# Patient Record
Sex: MALE
[De-identification: names, ages, dates, MRNs, and addresses within clinical notes are randomized per-mention and may not be internally consistent; named-entity substitution may affect disease eponyms.]

---

## 2018-01-16 ENCOUNTER — HOSPITAL ENCOUNTER (EMERGENCY)
Dept: HOSPITAL 42 - ED | Age: 48
Discharge: HOME | End: 2018-01-16
Payer: SELF-PAY

## 2018-01-16 VITALS — RESPIRATION RATE: 18 BRPM | OXYGEN SATURATION: 99 % | TEMPERATURE: 98.3 F

## 2018-01-16 VITALS — HEART RATE: 72 BPM | DIASTOLIC BLOOD PRESSURE: 80 MMHG | SYSTOLIC BLOOD PRESSURE: 125 MMHG

## 2018-01-16 DIAGNOSIS — K08.89: Primary | ICD-10-CM

## 2018-01-16 NOTE — ED PDOC
Arrival/HPI





- General


Chief Complaint: Dental Pain


Time Seen by Provider: 01/16/18 07:48


Historian: Patient





- History of Present Illness


Narrative History of Present Illness (Text): 


49 y/o male w/ pmhx of 1/13/18 dental abscess drainage by a Dr. Gtz at 

the [facility] on day 4 of clindamycin 600 mg qid, presnets c/o  dental pain 

refractory to 1q q6 hours of otc extra strength tylenol . Pt deeis fevers/ nor 

siginifciant pustulent drainage nor gingivocellulitis .


01/16/18 08:55





Symptom Onset: Gradual


Symptom Course: Improving


Quality: Aching





Past Medical History





- Provider Review


Nursing Documentation Reviewed: Yes





- Cardiac


Hx Cardiac Disorders: No





- Pulmonary


Hx Respiratory Disorders: Yes


Hx Asthma: Yes





- Neurological


Hx Neurological Disorder: No





- HEENT


Hx HEENT Disorder: No





- Renal


Hx Renal Disorder: No





- Endocrine/Metabolic


Hx Endocrine Disorders: No





- Hematological/Oncological


Hx Blood Disorders: No





- Integumentary


Hx Dermatological Disorder: No





- Musculoskeletal/Rheumatological


Hx Musculoskeletal Disorders: No





- Gastrointestinal


Hx Gastrointestinal Disorders: No





- Genitourinary/Gynecological


Hx Genitourinary Disorders: No





- Psychiatric


Hx Psychophysiologic Disorder: No


Hx Substance Use: No





Family/Social History





- Physician Review


Nursing Documentation Reviewed: Yes


Family/Social History: No Known Family HX


Smoking Status: Never Smoked


Hx Alcohol Use: No


Hx Substance Use: No





Allergies/Home Meds


Allergies/Adverse Reactions: 


Allergies





ibuprofen [From Motrin] Allergy (Verified 01/16/18 08:06)


 RASH


ketorolac [From Toradol] Allergy (Verified 01/16/18 08:06)


 RASH


latex Allergy (Verified 01/16/18 08:06)


 RASH


levofloxacin [From Levaquin] Allergy (Verified 01/16/18 08:06)


 RASH


Penicillins Allergy (Verified 01/16/18 08:06)


 RASH


povidone-iodine [From Betadine] Allergy (Verified 01/16/18 08:06)


 RASH


soap [From Betadine] Allergy (Verified 01/16/18 08:06)


 RASH


tramadol Allergy (Verified 01/16/18 08:06)


 RASH








Home Medications: 


 Home Meds











 Medication  Instructions  Recorded  Confirmed


 


Clindamycin [Cleocin] 600 mg PO QID 01/16/18 01/16/18














Review of Systems





- Physician Review


All systems were reviewed & negative as marked: Yes





- Review of Systems


Constitutional: Normal


Eyes: Normal


ENT: Other (dental pain )


Respiratory: Normal


Cardiovascular: Normal


Gastrointestinal: Normal


Genitourinary Male: Normal


Musculoskeletal: Normal


Skin: Normal


Neurological: Normal


Endocrine: Normal


Hemo/Lymphatic: Normal


Psychiatric: Normal





Physical Exam


Vital Signs Reviewed: Yes





Vital Signs











  Temp Pulse Resp BP Pulse Ox


 


 01/16/18 08:00  98.3 F  75  18  126/77  99











Temperature: Afebrile


Blood Pressure: Normal


Pulse: Regular


Respiratory Rate: Normal


Appearance: Positive for: Well-Appearing, Non-Toxic, Comfortable


Pain Distress: None


Mental Status: Positive for: Alert and Oriented X 3





- Systems Exam


Head: Present: Atraumatic, Normocephalic


Pupils: Present: PERRL


Extroacular Muscles: Present: EOMI


Conjunctiva: Present: Normal


Mouth: Present: Moist Mucous Membranes, Other (extensive caries, multiple 

healing gingival puncture sites, no pustulent drainage )


Neck: Present: Normal Range of Motion


Respiratory/Chest: Present: Clear to Auscultation, Good Air Exchange.  No: 

Respiratory Distress, Accessory Muscle Use


Cardiovascular: Present: Regular Rate and Rhythm, Normal S1, S2.  No: Murmurs


Abdomen: Present: Normal Bowel Sounds.  No: Tenderness, Distention, Peritoneal 

Signs


Back: Present: Normal Inspection


Upper Extremity: Present: Normal Inspection.  No: Cyanosis, Edema


Lower Extremity: Present: Normal Inspection.  No: Edema


Neurological: Present: GCS=15, CN II-XII Intact, Speech Normal, Motor Func 

Grossly Intact, Normal Sensory Function, Normal Cerebellar Funct, Norm Deep 

Tendon Reflexes, Gait Normal, Memory Normal


Skin: Present: Warm, Dry, Normal Color.  No: Rashes


Psychiatric: Present: Alert, Oriented x 3, Normal Insight, Normal Concentration





Medical Decision Making


ED Course and Treatment: 





01/16/18 09:04


49 y/o male w/ recent dental abscess w/ refractory pain. healing on schedule. 

Scheduled tooth extraction later today . 





Disposition/Present on Arrival





- Present on Arrival


Any Indicators Present on Arrival: No


History of DVT/PE: No


History of Uncontrolled Diabetes: No


Urinary Catheter: No


History of Decub. Ulcer: No


History Surgical Site Infection Following: None





- Disposition


Have Diagnosis and Disposition been Completed?: Yes


Diagnosis: 


 Dentalgia





Disposition: HOME/ ROUTINE


Disposition Time: 09:07


Patient Plan: Discharge


Condition: IMPROVED


Discharge Instructions (ExitCare):  Toothache (ED)


Print Language: ENGLISH


Additional Instructions: 


Please follow up with your original oral surgeon. Take the prescribed 

medication as needed.


Prescriptions: 


oxyCODONE/Acetaminophen [Percocet 5/325 mg Tab] 1 ea PO Q6 PRN #6 tab


 PRN Reason: Pain, Severe (8-10)


Forms:  [facility] (English), WORK NOTE